# Patient Record
Sex: FEMALE
[De-identification: names, ages, dates, MRNs, and addresses within clinical notes are randomized per-mention and may not be internally consistent; named-entity substitution may affect disease eponyms.]

---

## 2023-10-13 NOTE — TELEPHONE ENCOUNTER
FUTURE VISIT INFORMATION      FUTURE VISIT INFORMATION:  Date: 1/16/24  Time: 11:00am  [place] [facility] [place]FORMATION:    Reason for visit/diagnosis  Tummy tuck    RECORDS REQUESTED FROM:        Self referred- no recs to collect per pt

## 2023-11-05 ENCOUNTER — HEALTH MAINTENANCE LETTER (OUTPATIENT)
Age: 43
End: 2023-11-05

## 2024-01-15 NOTE — PROGRESS NOTES
"PLASTIC SURGERY HISTORY AND PHYSICAL    Chief Complaint: panniculus, excess skin  Referring Provider: Referred Self      HPI:   Homer is a 44 year old female PMH MO s/p donal en Y gastric bypass in 2008, now with excess skin causing her discomfort, desires skin removal.     Highest weight prior to surgery 280lbs, weight stable over the last year within 10lbs, current weight 146lbs. Would like to be 160lb or 170lbs but feels that she can't put on weight.     Her biggest concerns are discomfort from the excess skin, described as a pulling sensation on lateral abdomen and around anterior abdomen. Discomfort is always present but improves with wearing a compression garment. No issues with frequent rashes but has had issues with breakdown of skin along C section scar intermittently since 2022.     No known history of hernia. She is not concerned with how things look, only concerned with discomfort it causes.    Anemia- needs blood transfusions 2-3 times year.    PMH:   No past medical history on file.  anemia    PSH:   No past surgical history on file.  Gastric bypass  C section x3  Cholecystectomy   appendectomy    FH:   No family history on file.     SH:   Social History     Tobacco Use    Smoking status: Some Days     Types: Cigarettes    Smokeless tobacco: Never   Some day smoker, trying to quit with using the patch     Work/school: CNA    MEDS:     Current Outpatient Medications:     B-D LUER-JASPREET SYRINGE 25G X 1\" 3 ML MISC, , Disp: , Rfl:     cyanocobalamin (CYANOCOBALAMIN) 1000 MCG/ML injection, , Disp: , Rfl:     omeprazole (PRILOSEC) 20 MG DR capsule, , Disp: , Rfl:     vitamin D3 (CHOLECALCIFEROL) 125 MCG (5000 UT) tablet, Take 125 mcg by mouth, Disp: , Rfl:        ALLERGIES:     Allergies   Allergen Reactions    Morphine Anaphylaxis        ROS: Denies chest pain, shortness of breath, MI, CVA, DVT, PE, and bleeding disorders.     PHYSICAL EXAMINATION:   /74 (BP Location: Left arm, Patient Position: " "Sitting, Cuff Size: Adult Regular)   Pulse 81   Ht 1.651 m (5' 5\")   Wt 66.6 kg (146 lb 12.8 oz)   SpO2 100%   BMI 24.43 kg/m     BMI: Body mass index is 24.43 kg/m .  General: No acute distress.   Abdomen: thin abdomen with excess skin, minimal excess adipose tissue. + striae. Grade II panniculus. Right panniculus with contour irregularity from well healed open appendectomy scar. Well healed pfannenstiel scar, no rashes or open areas. No palpable hernia.    ASSESSMENT:   Homer is a 44 year old female PMH anemia, [place] s/p donal en Y gastric bypass in 2008, now BMI 24 with excess skin causing her discomfort, desires skin removal.      PLAN: Homer is a reasonable candidate for panniculectomy. We had a thorough discussion about the procedure today. I explained what a panniculectomy consists of and what it does not. I explained the difference between a panniculectomy and an abdominoplasty. I explained the risks to include bleeding, infection, injury to surrounding structures, fluid collection, wound healing difficulties, contour deformity, dog ears, asymmetry, loss of umbilicus, inability to remove all excess tissue, and DVT/PE. additionally we talked about that removing the excess skin is likely to relieve some of her symptoms it is possible that she still has some pulling sensation and discomfort after the skin is removed.     Patient needs to quit smoking and all nicotine products. Once she has quit for a few weeks she will let us know and we can place orders and order nutritional labs-albumin, pre albumin and cbc. Will need prior auth for panni.     We do not have access to her PCP documentation via care everywhere. Further chart review after patient left noted a recent encounter from [facility] for anemia workup, she was also referred to GI for EGD and colonoscopy. It appears her outside labs showed a hbg of 6.4 on 1/03/2023. She has follow up with hematology. Patient did not mention this in our visit " today. Prior to any surgery she will need medical clearance and improvement of her anemia.     Mena Valencia PA-C  Plastic and Reconstructive Surgery    60 minutes spent on the date of the encounter doing chart review, history and physical, dressing changes, documentation and further activity as noted above.

## 2024-01-16 ENCOUNTER — OFFICE VISIT (OUTPATIENT)
Dept: PLASTIC SURGERY | Facility: CLINIC | Age: 44
End: 2024-01-16
Payer: COMMERCIAL

## 2024-01-16 ENCOUNTER — PRE VISIT (OUTPATIENT)
Dept: PLASTIC SURGERY | Facility: CLINIC | Age: 44
End: 2024-01-16

## 2024-01-16 VITALS
OXYGEN SATURATION: 100 % | WEIGHT: 146.8 LBS | BODY MASS INDEX: 24.46 KG/M2 | HEIGHT: 65 IN | SYSTOLIC BLOOD PRESSURE: 123 MMHG | HEART RATE: 81 BPM | DIASTOLIC BLOOD PRESSURE: 74 MMHG

## 2024-01-16 DIAGNOSIS — L98.7 EXCESS SKIN OF ABDOMEN: Primary | ICD-10-CM

## 2024-01-16 PROCEDURE — 99205 OFFICE O/P NEW HI 60 MIN: CPT | Performed by: PHYSICIAN ASSISTANT

## 2024-01-16 RX ORDER — CYANOCOBALAMIN 1000 UG/ML
INJECTION, SOLUTION INTRAMUSCULAR; SUBCUTANEOUS
COMMUNITY
Start: 2024-01-05

## 2024-01-16 RX ORDER — SYRINGE WITH NEEDLE, 1 ML 25GX5/8"
SYRINGE, EMPTY DISPOSABLE MISCELLANEOUS
COMMUNITY
Start: 2024-01-05

## 2024-01-16 ASSESSMENT — PAIN SCALES - GENERAL: PAINLEVEL: NO PAIN (0)

## 2024-01-16 NOTE — LETTER
"1/16/2024       RE: Homer Infante  [address]  [facility] [address]     Dear Colleague,    Thank you for referring your patient, Homer Infante, to the [facility] [place] [facility]. Please see a copy of my visit note below.    PLASTIC SURGERY HISTORY AND PHYSICAL    Chief Complaint: panniculus, excess skin  Referring Provider: Referred Self      HPI:   Homer is a 44 year old female PMH MO s/p donal en Y gastric bypass in 2008, now with excess skin causing her discomfort, desires skin removal.     Highest weight prior to surgery 280lbs, weight stable over the last year within 10lbs, current weight 146lbs. Would like to be 160lb or 170lbs but feels that she can't put on weight.     Her biggest concerns are discomfort from the excess skin, described as a pulling sensation on lateral abdomen and around anterior abdomen. Discomfort is always present but improves with wearing a compression garment. No issues with frequent rashes but has had issues with breakdown of skin along C section scar intermittently since 2022.     No known history of hernia. She is not concerned with how things look, only concerned with discomfort it causes.    Anemia- needs blood transfusions 2-3 times year.    PMH:   No past medical history on file.  anemia    PSH:   No past surgical history on file.  Gastric bypass  C section x3  Cholecystectomy   appendectomy    FH:   No family history on file.     SH:   Social History     Tobacco Use    Smoking status: Some Days     Types: Cigarettes    Smokeless tobacco: Never   Some day smoker, trying to quit with using the patch     Work/school: CNA    MEDS:     Current Outpatient Medications:     B-D LUER-JASPREET SYRINGE 25G X 1\" 3 ML MISC, , Disp: , Rfl:     cyanocobalamin (CYANOCOBALAMIN) 1000 MCG/ML injection, , Disp: , Rfl:     omeprazole (PRILOSEC) 20 MG DR capsule, , Disp: , Rfl:     vitamin D3 " "(CHOLECALCIFEROL) 125 MCG (5000 UT) tablet, Take 125 mcg by mouth, Disp: , Rfl:        ALLERGIES:     Allergies   Allergen Reactions    Morphine Anaphylaxis        ROS: Denies chest pain, shortness of breath, MI, CVA, DVT, PE, and bleeding disorders.     PHYSICAL EXAMINATION:   /74 (BP Location: Left arm, Patient Position: Sitting, Cuff Size: Adult Regular)   Pulse 81   Ht 1.651 m (5' 5\")   Wt 66.6 kg (146 lb 12.8 oz)   SpO2 100%   BMI 24.43 kg/m     BMI: Body mass index is 24.43 kg/m .  General: No acute distress.   Abdomen: thin abdomen with excess skin, minimal excess adipose tissue. + striae. Grade II panniculus. Right panniculus with contour irregularity from well healed open appendectomy scar. Well healed pfannenstiel scar, no rashes or open areas. No palpable hernia.    ASSESSMENT:   Homer is a 44 year old female PMH anemia, [place] s/p donal en Y gastric bypass in 2008, now BMI 24 with excess skin causing her discomfort, desires skin removal.      PLAN: Homer is a reasonable candidate for panniculectomy. We had a thorough discussion about the procedure today. I explained what a panniculectomy consists of and what it does not. I explained the difference between a panniculectomy and an abdominoplasty. I explained the risks to include bleeding, infection, injury to surrounding structures, fluid collection, wound healing difficulties, contour deformity, dog ears, asymmetry, loss of umbilicus, inability to remove all excess tissue, and DVT/PE. additionally we talked about that removing the excess skin is likely to relieve some of her symptoms it is possible that she still has some pulling sensation and discomfort after the skin is removed.     Patient needs to quit smoking and all nicotine products. Once she has quit for a few weeks she will let us know and we can place orders and order nutritional labs-albumin, pre albumin and cbc. Will need prior auth for panni.     We do not have access to her PCP " documentation via care everywhere. Further chart review after patient left noted a recent encounter from [facility] for anemia workup, she was also referred to GI for EGD and colonoscopy. It appears her outside labs showed a hbg of 6.4 on 1/03/2023. She has follow up with hematology. Patient did not mention this in our visit today. Prior to any surgery she will need medical clearance and improvement of her anemia.       60 minutes spent on the date of the encounter doing chart review, history and physical, dressing changes, documentation and further activity as noted above.       Again, thank you for allowing me to participate in the care of your patient.      Sincerely,    Mena Valencia PA-C

## 2024-01-16 NOTE — NURSING NOTE
"Chief Complaint   Patient presents with    Consult     Tummy tuck.       Vitals:    01/16/24 1048   BP: 123/74   BP Location: Left arm   Patient Position: Sitting   Cuff Size: Adult Regular   Pulse: 81   SpO2: 100%   Weight: 66.6 kg (146 lb 12.8 oz)   Height: 1.651 m (5' 5\")       Body mass index is 24.43 kg/m .      Donn Klein, EMT    "

## 2024-02-08 ENCOUNTER — PATIENT OUTREACH (OUTPATIENT)
Dept: PLASTIC SURGERY | Facility: CLINIC | Age: 44
End: 2024-02-08
Payer: COMMERCIAL

## 2024-02-08 NOTE — TELEPHONE ENCOUNTER
Pt requested lab orders be faxed to an outside facility, this was done per her request. She saw heme outside of [facility] at [facility]. I cannot access her records for [facility] through Care Everywhere, it brings up an alert that says that she has not given them permission on the other end for us to query the record. I told her this, and that she needs to get the records to us another way.

## 2024-03-24 ENCOUNTER — HEALTH MAINTENANCE LETTER (OUTPATIENT)
Age: 44
End: 2024-03-24

## 2024-12-22 ENCOUNTER — HEALTH MAINTENANCE LETTER (OUTPATIENT)
Age: 44
End: 2024-12-22